# Patient Record
Sex: MALE | Race: WHITE
[De-identification: names, ages, dates, MRNs, and addresses within clinical notes are randomized per-mention and may not be internally consistent; named-entity substitution may affect disease eponyms.]

---

## 2019-08-14 ENCOUNTER — HOSPITAL ENCOUNTER (OUTPATIENT)
Dept: HOSPITAL 53 - M SMT | Age: 54
End: 2019-08-14
Attending: INTERNAL MEDICINE

## 2019-08-14 DIAGNOSIS — Z00.00: Primary | ICD-10-CM

## 2019-08-14 NOTE — REP
HISTORY: Disability determination.

 

AP, lateral, open mouth and swimmer's views were obtained.

 

There is advanced disc space narrowing at C5-6 with more moderate disc space

narrowing at C6-7. Posterior disc space narrowing is seen at other levels.

Vertebral body height and alignment is within normal limits. Hypertrophic

degenerative facet and uncovertebral joint changes are present at every level

bilaterally. The dens cannot be accurately assessed due to the superimposition of

osseous structures on both open mouth views.

 

IMPRESSION:

 

Chronic changes as described above seen on this limited exam.

 

 

Electronically Signed by

Glenn Woodward DO 08/15/2019 03:22 P

## 2019-08-14 NOTE — REP
REASON:  Disability.

 

COMPARISON:  None.

 

Internal fixation plate and screws seen affixing an old healed derotational

osteotomy site. The bones are demineralized. There is no evidence of an acute

fracture. There is tricompartmental narrowing and minimal tricompartmental

marginal osteophytosis.

 

IMPRESSION:

 

Chronic changes.

 

 

Electronically Signed by

Glenn Woodward DO 08/15/2019 03:20 P